# Patient Record
Sex: MALE | Race: ASIAN | URBAN - METROPOLITAN AREA
[De-identification: names, ages, dates, MRNs, and addresses within clinical notes are randomized per-mention and may not be internally consistent; named-entity substitution may affect disease eponyms.]

---
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
## 2025-04-03 NOTE — PROCEDURE: MEDICATION COUNSELING
"Please see patient MyChart message  -states patient diagnosed with fracture in urgent care  -inquiring about follow up    Per chart review   V with Dr. French  \"Thoracic spine xray report confirms a thoracic compression fracture.  This appears to be an old compression fracture as the radiologist noted that is is unchanged (may have looked at your old films for this).  There was a nodule which has increased from prior CT scan and recommending a CT scan for follow up.  This will need follow up by primary provider.  Reviewed with patient possible compression fracture, will follow up on formal xray report and notify if positive for this etiology.  Discussed underlying arthritis and at this time will have patient take tylenol 1000 mg 2-3 times a day.  Okay for lidocaine patches for discomfort.  Unable to take NSAIDs due to eliquis use.   Reassurance given that symptoms presentation is not concerning for pneumonia or a.fib etiology.   Recommend follow up with primary provider in 2 weeks for recheck\"    Argentina Ladd RN   " Benzoyl Peroxide Pregnancy And Lactation Text: This medication is Pregnancy Category C. It is unknown if benzoyl peroxide is excreted in breast milk.